# Patient Record
Sex: FEMALE | Race: WHITE | NOT HISPANIC OR LATINO | Employment: OTHER | URBAN - METROPOLITAN AREA
[De-identification: names, ages, dates, MRNs, and addresses within clinical notes are randomized per-mention and may not be internally consistent; named-entity substitution may affect disease eponyms.]

---

## 2017-06-12 ENCOUNTER — APPOINTMENT (OUTPATIENT)
Dept: PREADMISSION TESTING | Facility: HOSPITAL | Age: 57
End: 2017-06-12

## 2017-06-12 ENCOUNTER — HOSPITAL ENCOUNTER (OUTPATIENT)
Dept: GENERAL RADIOLOGY | Facility: HOSPITAL | Age: 57
Discharge: HOME | End: 2017-06-12
Admitting: COLON & RECTAL SURGERY

## 2017-06-12 VITALS — BODY MASS INDEX: 21.89 KG/M2 | HEIGHT: 65 IN | WEIGHT: 131.39 LBS

## 2017-06-12 LAB
ANION GAP SERPL CALCULATED.3IONS-SCNC: 0 MMOL/L (ref 3–11)
BUN BLD-MCNC: 9 MG/DL (ref 9–23)
BUN/CREAT SERPL: 12.9 (ref 7–25)
CALCIUM SPEC-SCNC: 10.7 MG/DL (ref 8.7–10.4)
CHLORIDE SERPL-SCNC: 111 MMOL/L (ref 99–109)
CO2 SERPL-SCNC: 30 MMOL/L (ref 20–31)
CREAT BLD-MCNC: 0.7 MG/DL (ref 0.6–1.3)
DEPRECATED RDW RBC AUTO: 43.1 FL (ref 37–54)
ERYTHROCYTE [DISTWIDTH] IN BLOOD BY AUTOMATED COUNT: 13 % (ref 11.3–14.5)
GFR SERPL CREATININE-BSD FRML MDRD: 87 ML/MIN/1.73
GLUCOSE BLD-MCNC: 96 MG/DL (ref 70–100)
HBA1C MFR BLD: 5.1 % (ref 4.8–5.6)
HCT VFR BLD AUTO: 49.3 % (ref 34.5–44)
HGB BLD-MCNC: 16.5 G/DL (ref 11.5–15.5)
MCH RBC QN AUTO: 30.4 PG (ref 27–31)
MCHC RBC AUTO-ENTMCNC: 33.5 G/DL (ref 32–36)
MCV RBC AUTO: 91 FL (ref 80–99)
PLATELET # BLD AUTO: 375 10*3/MM3 (ref 150–450)
PMV BLD AUTO: 9.3 FL (ref 6–12)
POTASSIUM BLD-SCNC: 4.7 MMOL/L (ref 3.5–5.5)
RBC # BLD AUTO: 5.42 10*6/MM3 (ref 3.89–5.14)
SODIUM BLD-SCNC: 141 MMOL/L (ref 132–146)
WBC NRBC COR # BLD: 11.93 10*3/MM3 (ref 3.5–10.8)

## 2017-06-12 PROCEDURE — 71020 HC CHEST PA AND LATERAL: CPT

## 2017-06-12 PROCEDURE — 93005 ELECTROCARDIOGRAM TRACING: CPT

## 2017-06-12 PROCEDURE — 80048 BASIC METABOLIC PNL TOTAL CA: CPT | Performed by: COLON & RECTAL SURGERY

## 2017-06-12 PROCEDURE — 85027 COMPLETE CBC AUTOMATED: CPT | Performed by: COLON & RECTAL SURGERY

## 2017-06-12 PROCEDURE — 36415 COLL VENOUS BLD VENIPUNCTURE: CPT

## 2017-06-12 PROCEDURE — 83036 HEMOGLOBIN GLYCOSYLATED A1C: CPT | Performed by: COLON & RECTAL SURGERY

## 2017-06-12 PROCEDURE — 93010 ELECTROCARDIOGRAM REPORT: CPT | Performed by: INTERNAL MEDICINE

## 2017-06-12 RX ORDER — OMEPRAZOLE 20 MG/1
20 CAPSULE, DELAYED RELEASE ORAL AS NEEDED
COMMUNITY

## 2017-06-12 RX ORDER — LISINOPRIL 5 MG/1
5 TABLET ORAL DAILY
COMMUNITY

## 2017-06-12 NOTE — NURSING NOTE
FRANTZ for ostomy education in [facility] [employer] start kit reviewed with patient and significant other.  Pt stated it is unlikely she will need an ostomy.  We reviewed what a temporary ileostomy is, diet, reversal of ostomy, diverticulitis, answered all questions.  Will mainor patient in Pre-op as the patient is a week away from surgery.  Please call [phone] for stoma marking or questions or concerns.

## 2017-06-12 NOTE — PAT
GI NURSE NOTIFIED OF SURGERY AT THIS TIME.     STOMA NURSE NOTIFIED PATIENT WILL DO STOMA MARKING DAY OF SURGERY.    ERAS INFORMATION GIVEN AND EXPLAINED.

## 2017-06-19 ENCOUNTER — ANESTHESIA EVENT (OUTPATIENT)
Dept: PERIOP | Facility: HOSPITAL | Age: 57
End: 2017-06-19

## 2017-06-19 ENCOUNTER — HOSPITAL ENCOUNTER (INPATIENT)
Facility: HOSPITAL | Age: 57
LOS: 2 days | Discharge: HOME | End: 2017-06-21
Attending: COLON & RECTAL SURGERY | Admitting: COLON & RECTAL SURGERY

## 2017-06-19 ENCOUNTER — ANESTHESIA (OUTPATIENT)
Dept: PERIOP | Facility: HOSPITAL | Age: 57
End: 2017-06-19

## 2017-06-19 DIAGNOSIS — K57.92 DIVERTICULITIS: ICD-10-CM

## 2017-06-19 PROBLEM — K21.9 GERD (GASTROESOPHAGEAL REFLUX DISEASE): Status: ACTIVE | Noted: 2017-06-19

## 2017-06-19 PROBLEM — I10 HTN (HYPERTENSION): Status: ACTIVE | Noted: 2017-06-19

## 2017-06-19 LAB
ABO GROUP BLD: NORMAL
ABO GROUP BLD: NORMAL
BLD GP AB SCN SERPL QL: NEGATIVE
HCT VFR BLD AUTO: 44.3 % (ref 34.5–44)
HGB BLD-MCNC: 14.7 G/DL (ref 11.5–15.5)
POTASSIUM BLDA-SCNC: 3.87 MMOL/L (ref 3.5–5.3)
RH BLD: POSITIVE
RH BLD: POSITIVE

## 2017-06-19 PROCEDURE — 25010000002 PROPOFOL 10 MG/ML EMULSION: Performed by: NURSE ANESTHETIST, CERTIFIED REGISTERED

## 2017-06-19 PROCEDURE — 85014 HEMATOCRIT: CPT | Performed by: COLON & RECTAL SURGERY

## 2017-06-19 PROCEDURE — 86901 BLOOD TYPING SEROLOGIC RH(D): CPT

## 2017-06-19 PROCEDURE — 0DJD8ZZ INSPECTION OF LOWER INTESTINAL TRACT, VIA NATURAL OR ARTIFICIAL OPENING ENDOSCOPIC: ICD-10-PCS | Performed by: COLON & RECTAL SURGERY

## 2017-06-19 PROCEDURE — 25010000002 HEPARIN (PORCINE) PER 1000 UNITS: Performed by: COLON & RECTAL SURGERY

## 2017-06-19 PROCEDURE — 86900 BLOOD TYPING SEROLOGIC ABO: CPT

## 2017-06-19 PROCEDURE — 84132 ASSAY OF SERUM POTASSIUM: CPT | Performed by: ANESTHESIOLOGY

## 2017-06-19 PROCEDURE — 25010000002 NEOSTIGMINE 10 MG/10ML SOLUTION: Performed by: NURSE ANESTHETIST, CERTIFIED REGISTERED

## 2017-06-19 PROCEDURE — 0DBN4ZZ EXCISION OF SIGMOID COLON, PERCUTANEOUS ENDOSCOPIC APPROACH: ICD-10-PCS | Performed by: COLON & RECTAL SURGERY

## 2017-06-19 PROCEDURE — 25010000002 PROPOFOL 1000 MG/ML EMULSION: Performed by: NURSE ANESTHETIST, CERTIFIED REGISTERED

## 2017-06-19 PROCEDURE — 25010000002 ONDANSETRON PER 1 MG: Performed by: NURSE ANESTHETIST, CERTIFIED REGISTERED

## 2017-06-19 PROCEDURE — 85018 HEMOGLOBIN: CPT | Performed by: COLON & RECTAL SURGERY

## 2017-06-19 PROCEDURE — 86850 RBC ANTIBODY SCREEN: CPT | Performed by: COLON & RECTAL SURGERY

## 2017-06-19 PROCEDURE — 0DTJ4ZZ RESECTION OF APPENDIX, PERCUTANEOUS ENDOSCOPIC APPROACH: ICD-10-PCS | Performed by: COLON & RECTAL SURGERY

## 2017-06-19 PROCEDURE — 86901 BLOOD TYPING SEROLOGIC RH(D): CPT | Performed by: COLON & RECTAL SURGERY

## 2017-06-19 PROCEDURE — 88307 TISSUE EXAM BY PATHOLOGIST: CPT | Performed by: COLON & RECTAL SURGERY

## 2017-06-19 PROCEDURE — 86900 BLOOD TYPING SEROLOGIC ABO: CPT | Performed by: COLON & RECTAL SURGERY

## 2017-06-19 PROCEDURE — 0DBP4ZZ EXCISION OF RECTUM, PERCUTANEOUS ENDOSCOPIC APPROACH: ICD-10-PCS | Performed by: COLON & RECTAL SURGERY

## 2017-06-19 RX ORDER — SODIUM CHLORIDE 0.9 % (FLUSH) 0.9 %
1-10 SYRINGE (ML) INJECTION AS NEEDED
Status: DISCONTINUED | OUTPATIENT
Start: 2017-06-19 | End: 2017-06-19 | Stop reason: HOSPADM

## 2017-06-19 RX ORDER — ALVIMOPAN 12 MG/1
12 CAPSULE ORAL ONCE
Status: COMPLETED | OUTPATIENT
Start: 2017-06-19 | End: 2017-06-19

## 2017-06-19 RX ORDER — NEOSTIGMINE METHYLSULFATE 1 MG/ML
INJECTION, SOLUTION INTRAVENOUS AS NEEDED
Status: DISCONTINUED | OUTPATIENT
Start: 2017-06-19 | End: 2017-06-19 | Stop reason: SURG

## 2017-06-19 RX ORDER — PANTOPRAZOLE SODIUM 40 MG/1
40 TABLET, DELAYED RELEASE ORAL EVERY MORNING
Status: DISCONTINUED | OUTPATIENT
Start: 2017-06-20 | End: 2017-06-19 | Stop reason: HOSPADM

## 2017-06-19 RX ORDER — LISINOPRIL 5 MG/1
5 TABLET ORAL DAILY
Status: DISCONTINUED | OUTPATIENT
Start: 2017-06-20 | End: 2017-06-19 | Stop reason: HOSPADM

## 2017-06-19 RX ORDER — OXYCODONE HYDROCHLORIDE AND ACETAMINOPHEN 5; 325 MG/1; MG/1
1 TABLET ORAL ONCE AS NEEDED
Status: DISCONTINUED | OUTPATIENT
Start: 2017-06-19 | End: 2017-06-19 | Stop reason: HOSPADM

## 2017-06-19 RX ORDER — SODIUM CHLORIDE, SODIUM LACTATE, POTASSIUM CHLORIDE, CALCIUM CHLORIDE 600; 310; 30; 20 MG/100ML; MG/100ML; MG/100ML; MG/100ML
9 INJECTION, SOLUTION INTRAVENOUS CONTINUOUS
Status: DISCONTINUED | OUTPATIENT
Start: 2017-06-19 | End: 2017-06-19 | Stop reason: HOSPADM

## 2017-06-19 RX ORDER — PROMETHAZINE HYDROCHLORIDE 25 MG/1
25 TABLET ORAL ONCE AS NEEDED
Status: DISCONTINUED | OUTPATIENT
Start: 2017-06-19 | End: 2017-06-19 | Stop reason: HOSPADM

## 2017-06-19 RX ORDER — SCOLOPAMINE TRANSDERMAL SYSTEM 1 MG/1
1 PATCH, EXTENDED RELEASE TRANSDERMAL ONCE
Status: DISCONTINUED | OUTPATIENT
Start: 2017-06-19 | End: 2017-06-19

## 2017-06-19 RX ORDER — ACETAMINOPHEN 10 MG/ML
1000 INJECTION, SOLUTION INTRAVENOUS EVERY 6 HOURS
Status: COMPLETED | OUTPATIENT
Start: 2017-06-19 | End: 2017-06-20

## 2017-06-19 RX ORDER — MORPHINE SULFATE 2 MG/ML
1 INJECTION, SOLUTION INTRAMUSCULAR; INTRAVENOUS EVERY 4 HOURS PRN
Status: DISCONTINUED | OUTPATIENT
Start: 2017-06-19 | End: 2017-06-21 | Stop reason: HOSPADM

## 2017-06-19 RX ORDER — HEPARIN SODIUM 5000 [USP'U]/ML
5000 INJECTION, SOLUTION INTRAVENOUS; SUBCUTANEOUS EVERY 8 HOURS SCHEDULED
Status: DISCONTINUED | OUTPATIENT
Start: 2017-06-20 | End: 2017-06-21 | Stop reason: HOSPADM

## 2017-06-19 RX ORDER — GLYCOPYRROLATE 0.2 MG/ML
INJECTION INTRAMUSCULAR; INTRAVENOUS AS NEEDED
Status: DISCONTINUED | OUTPATIENT
Start: 2017-06-19 | End: 2017-06-19 | Stop reason: SURG

## 2017-06-19 RX ORDER — DIAZEPAM 5 MG/ML
5 INJECTION, SOLUTION INTRAMUSCULAR; INTRAVENOUS EVERY 6 HOURS PRN
Status: DISCONTINUED | OUTPATIENT
Start: 2017-06-19 | End: 2017-06-21 | Stop reason: HOSPADM

## 2017-06-19 RX ORDER — ALVIMOPAN 12 MG/1
12 CAPSULE ORAL 2 TIMES DAILY
Status: DISCONTINUED | OUTPATIENT
Start: 2017-06-20 | End: 2017-06-21 | Stop reason: HOSPADM

## 2017-06-19 RX ORDER — ONDANSETRON 2 MG/ML
INJECTION INTRAMUSCULAR; INTRAVENOUS AS NEEDED
Status: DISCONTINUED | OUTPATIENT
Start: 2017-06-19 | End: 2017-06-19 | Stop reason: SURG

## 2017-06-19 RX ORDER — ACETAMINOPHEN 10 MG/ML
1000 INJECTION, SOLUTION INTRAVENOUS ONCE
Status: COMPLETED | OUTPATIENT
Start: 2017-06-19 | End: 2017-06-19

## 2017-06-19 RX ORDER — HYDRALAZINE HYDROCHLORIDE 20 MG/ML
10 INJECTION INTRAMUSCULAR; INTRAVENOUS EVERY 6 HOURS PRN
Status: DISCONTINUED | OUTPATIENT
Start: 2017-06-19 | End: 2017-06-21 | Stop reason: HOSPADM

## 2017-06-19 RX ORDER — DEXTROSE MONOHYDRATE, SODIUM CHLORIDE, SODIUM LACTATE, POTASSIUM CHLORIDE, CALCIUM CHLORIDE 5; 600; 310; 179; 20 G/100ML; MG/100ML; MG/100ML; MG/100ML; MG/100ML
125 INJECTION, SOLUTION INTRAVENOUS CONTINUOUS
Status: DISCONTINUED | OUTPATIENT
Start: 2017-06-19 | End: 2017-06-20

## 2017-06-19 RX ORDER — FENTANYL CITRATE 50 UG/ML
50 INJECTION, SOLUTION INTRAMUSCULAR; INTRAVENOUS
Status: DISCONTINUED | OUTPATIENT
Start: 2017-06-19 | End: 2017-06-19 | Stop reason: HOSPADM

## 2017-06-19 RX ORDER — ONDANSETRON 2 MG/ML
4 INJECTION INTRAMUSCULAR; INTRAVENOUS ONCE AS NEEDED
Status: DISCONTINUED | OUTPATIENT
Start: 2017-06-19 | End: 2017-06-19 | Stop reason: HOSPADM

## 2017-06-19 RX ORDER — LABETALOL HYDROCHLORIDE 5 MG/ML
5 INJECTION, SOLUTION INTRAVENOUS
Status: DISCONTINUED | OUTPATIENT
Start: 2017-06-19 | End: 2017-06-19 | Stop reason: HOSPADM

## 2017-06-19 RX ORDER — PROMETHAZINE HYDROCHLORIDE 25 MG/1
25 SUPPOSITORY RECTAL ONCE AS NEEDED
Status: DISCONTINUED | OUTPATIENT
Start: 2017-06-19 | End: 2017-06-19 | Stop reason: HOSPADM

## 2017-06-19 RX ORDER — MAGNESIUM HYDROXIDE 1200 MG/15ML
LIQUID ORAL AS NEEDED
Status: DISCONTINUED | OUTPATIENT
Start: 2017-06-19 | End: 2017-06-19 | Stop reason: HOSPADM

## 2017-06-19 RX ORDER — PROPOFOL 10 MG/ML
VIAL (ML) INTRAVENOUS AS NEEDED
Status: DISCONTINUED | OUTPATIENT
Start: 2017-06-19 | End: 2017-06-19 | Stop reason: SURG

## 2017-06-19 RX ORDER — HYDRALAZINE HYDROCHLORIDE 20 MG/ML
5 INJECTION INTRAMUSCULAR; INTRAVENOUS
Status: DISCONTINUED | OUTPATIENT
Start: 2017-06-19 | End: 2017-06-19 | Stop reason: HOSPADM

## 2017-06-19 RX ORDER — ONDANSETRON 2 MG/ML
4 INJECTION INTRAMUSCULAR; INTRAVENOUS EVERY 6 HOURS PRN
Status: DISCONTINUED | OUTPATIENT
Start: 2017-06-19 | End: 2017-06-21 | Stop reason: HOSPADM

## 2017-06-19 RX ORDER — HYDROMORPHONE HYDROCHLORIDE 1 MG/ML
0.5 INJECTION, SOLUTION INTRAMUSCULAR; INTRAVENOUS; SUBCUTANEOUS
Status: DISCONTINUED | OUTPATIENT
Start: 2017-06-19 | End: 2017-06-21 | Stop reason: HOSPADM

## 2017-06-19 RX ORDER — FAMOTIDINE 20 MG/1
20 TABLET, FILM COATED ORAL ONCE
Status: COMPLETED | OUTPATIENT
Start: 2017-06-19 | End: 2017-06-19

## 2017-06-19 RX ORDER — LIDOCAINE HYDROCHLORIDE 10 MG/ML
INJECTION, SOLUTION EPIDURAL; INFILTRATION; INTRACAUDAL; PERINEURAL AS NEEDED
Status: DISCONTINUED | OUTPATIENT
Start: 2017-06-19 | End: 2017-06-19 | Stop reason: SURG

## 2017-06-19 RX ORDER — PROMETHAZINE HYDROCHLORIDE 25 MG/ML
6.25 INJECTION, SOLUTION INTRAMUSCULAR; INTRAVENOUS ONCE AS NEEDED
Status: DISCONTINUED | OUTPATIENT
Start: 2017-06-19 | End: 2017-06-19 | Stop reason: HOSPADM

## 2017-06-19 RX ORDER — OXYCODONE AND ACETAMINOPHEN 7.5; 325 MG/1; MG/1
1 TABLET ORAL ONCE AS NEEDED
Status: DISCONTINUED | OUTPATIENT
Start: 2017-06-19 | End: 2017-06-19 | Stop reason: HOSPADM

## 2017-06-19 RX ORDER — MEPERIDINE HYDROCHLORIDE 25 MG/ML
12.5 INJECTION INTRAMUSCULAR; INTRAVENOUS; SUBCUTANEOUS
Status: DISCONTINUED | OUTPATIENT
Start: 2017-06-19 | End: 2017-06-19 | Stop reason: HOSPADM

## 2017-06-19 RX ORDER — HEPARIN SODIUM 5000 [USP'U]/ML
5000 INJECTION, SOLUTION INTRAVENOUS; SUBCUTANEOUS ONCE
Status: COMPLETED | OUTPATIENT
Start: 2017-06-19 | End: 2017-06-19

## 2017-06-19 RX ORDER — PREGABALIN 75 MG/1
75 CAPSULE ORAL ONCE
Status: COMPLETED | OUTPATIENT
Start: 2017-06-19 | End: 2017-06-19

## 2017-06-19 RX ORDER — CELECOXIB 200 MG/1
400 CAPSULE ORAL ONCE
Status: COMPLETED | OUTPATIENT
Start: 2017-06-19 | End: 2017-06-19

## 2017-06-19 RX ORDER — NALOXONE HCL 0.4 MG/ML
0.4 VIAL (ML) INJECTION
Status: DISCONTINUED | OUTPATIENT
Start: 2017-06-19 | End: 2017-06-21 | Stop reason: HOSPADM

## 2017-06-19 RX ORDER — ATRACURIUM BESYLATE 10 MG/ML
INJECTION, SOLUTION INTRAVENOUS AS NEEDED
Status: DISCONTINUED | OUTPATIENT
Start: 2017-06-19 | End: 2017-06-19 | Stop reason: SURG

## 2017-06-19 RX ORDER — NALOXONE HCL 0.4 MG/ML
0.4 VIAL (ML) INJECTION AS NEEDED
Status: DISCONTINUED | OUTPATIENT
Start: 2017-06-19 | End: 2017-06-19 | Stop reason: HOSPADM

## 2017-06-19 RX ORDER — IPRATROPIUM BROMIDE AND ALBUTEROL SULFATE 2.5; .5 MG/3ML; MG/3ML
3 SOLUTION RESPIRATORY (INHALATION) ONCE AS NEEDED
Status: DISCONTINUED | OUTPATIENT
Start: 2017-06-19 | End: 2017-06-19 | Stop reason: HOSPADM

## 2017-06-19 RX ORDER — HYDROMORPHONE HYDROCHLORIDE 1 MG/ML
0.5 INJECTION, SOLUTION INTRAMUSCULAR; INTRAVENOUS; SUBCUTANEOUS
Status: DISCONTINUED | OUTPATIENT
Start: 2017-06-19 | End: 2017-06-19 | Stop reason: HOSPADM

## 2017-06-19 RX ORDER — SODIUM CHLORIDE 9 MG/ML
INJECTION, SOLUTION INTRAVENOUS AS NEEDED
Status: DISCONTINUED | OUTPATIENT
Start: 2017-06-19 | End: 2017-06-19 | Stop reason: HOSPADM

## 2017-06-19 RX ADMIN — HEPARIN SODIUM 5000 UNITS: 5000 INJECTION, SOLUTION INTRAVENOUS; SUBCUTANEOUS at 14:05

## 2017-06-19 RX ADMIN — ATRACURIUM BESYLATE 50 MG: 10 INJECTION, SOLUTION INTRAVENOUS at 15:41

## 2017-06-19 RX ADMIN — GLYCOPYRROLATE 0.2 MG: 0.2 INJECTION, SOLUTION INTRAMUSCULAR; INTRAVENOUS at 17:14

## 2017-06-19 RX ADMIN — PROPOFOL 200 MG: 10 INJECTION, EMULSION INTRAVENOUS at 15:41

## 2017-06-19 RX ADMIN — PROPOFOL 35 MCG/KG/MIN: 10 INJECTION, EMULSION INTRAVENOUS at 15:46

## 2017-06-19 RX ADMIN — FAMOTIDINE 20 MG: 20 TABLET ORAL at 14:10

## 2017-06-19 RX ADMIN — SODIUM CHLORIDE, POTASSIUM CHLORIDE, SODIUM LACTATE AND CALCIUM CHLORIDE: 600; 310; 30; 20 INJECTION, SOLUTION INTRAVENOUS at 17:16

## 2017-06-19 RX ADMIN — CELECOXIB 400 MG: 200 CAPSULE ORAL at 14:33

## 2017-06-19 RX ADMIN — LIDOCAINE HYDROCHLORIDE 50 MG: 10 INJECTION, SOLUTION EPIDURAL; INFILTRATION; INTRACAUDAL; PERINEURAL at 15:41

## 2017-06-19 RX ADMIN — ONDANSETRON 4 MG: 2 INJECTION INTRAMUSCULAR; INTRAVENOUS at 17:04

## 2017-06-19 RX ADMIN — NEOSTIGMINE METHYLSULFATE 1.5 MG: 1 INJECTION, SOLUTION INTRAVENOUS at 17:14

## 2017-06-19 RX ADMIN — ACETAMINOPHEN 1000 MG: 10 INJECTION, SOLUTION INTRAVENOUS at 18:10

## 2017-06-19 RX ADMIN — SODIUM CHLORIDE, POTASSIUM CHLORIDE, SODIUM LACTATE AND CALCIUM CHLORIDE 9 ML/HR: 600; 310; 30; 20 INJECTION, SOLUTION INTRAVENOUS at 13:55

## 2017-06-19 RX ADMIN — PREGABALIN 75 MG: 75 CAPSULE ORAL at 14:10

## 2017-06-19 RX ADMIN — ERTAPENEM SODIUM 1 G: 1 INJECTION, POWDER, LYOPHILIZED, FOR SOLUTION INTRAMUSCULAR; INTRAVENOUS at 14:15

## 2017-06-19 RX ADMIN — POTASSIUM CHLORIDE, SODIUM CHLORIDE, CALCIUM CHLORIDE, SODIUM LACTATE, AND DEXTROSE MONOHYDRATE 125 ML/HR: 1.79; 6; .2; 3.1; 5 INJECTION, SOLUTION INTRAVENOUS at 20:24

## 2017-06-19 RX ADMIN — ACETAMINOPHEN 1000 MG: 10 INJECTION, SOLUTION INTRAVENOUS at 16:35

## 2017-06-19 RX ADMIN — SCOPALAMINE 1 PATCH: 1 PATCH, EXTENDED RELEASE TRANSDERMAL at 14:10

## 2017-06-19 RX ADMIN — ALVIMOPAN 12 MG: 12 CAPSULE ORAL at 14:33

## 2017-06-19 NOTE — PLAN OF CARE
Problem: Patient Care Overview (Adult)  Goal: Plan of Care Review  Outcome: Ongoing (interventions implemented as appropriate)    06/19/17 7508   Coping/Psychosocial Response Interventions   Plan Of Care Reviewed With patient;family   Patient Care Overview   Progress no change   Outcome Evaluation   Outcome Summary/Follow up Plan Pt marked for colo versus ileo (most likely will be an ileo) in right and left upper and lower quadrants. Pt able to visualize sites. Beltline marked on abdomen. Education reviewed and questions answered. WOCN will f/u PRN after sx if needed for ostomy care and management.

## 2017-06-19 NOTE — BRIEF OP NOTE
COLON RESECTION LAPAROSCOPIC SIGMOID OR LOW ANTERIOR  Procedure Note    Kendy Langley  6/19/2017    Pre-op Diagnosis:   Recurrent diverticulitis, unwanted appendix    Post-op Diagnosis:     Same  Procedure/CPT® Codes:      Procedure(s):  COLON RESECTION LAPAROSCOPIC LOW ANTERIOR, WITH APPENDECTOMY    Surgeon(s):  Petra Coburn MD    Anesthesia: General with Block    Staff:   Circulator: Claudia Mckeon RN; Leeann Granados RN  Scrub Person: Majo Zimmer  Assistant: TATI Perrin    Estimated Blood Loss: 25 mL  Urine Voided: 50 mL    Specimens:                  ID Type Source Tests Collected by Time Destination   A : APPENDIX  Tissue Large Intestine, Appendix TISSUE EXAM Petra Coburn MD 6/19/2017 1640    B : sigmoid, upper rectum, anastomosis tissue Tissue Large Intestine, Sigmoid Colon TISSUE EXAM Petra Coburn MD 6/19/2017 1707          Drains:   Urethral Catheter 06/19/17 100% silicone 16 (Active)   Daily Indications Selected surgeries ( tract, abdomen) 6/19/2017  5:30 PM   Securement secured to upper leg with adhesive device 6/19/2017  5:30 PM           Findings: Chronically inflamed sigmoid colon, negative leak test    Complications: None      Petra Coburn MD     Date: 6/19/2017  Time: 5:48 PM

## 2017-06-19 NOTE — H&P
"Admission HP     Patient Name: Kendy Langley  MRN: [id number]  : 1960  DOS: 2017    Attending: Petra Coburn MD    Primary Care Provider: LONNY Joshi      Patient Care Team:  LONNY Silvestre as PCP - General (Family Medicine)    Chief complaint:  Diverticulitis    Subjective   Patient is a 56 y.o. female presented for schedule surgery by Dr. Coburn.  She anticipates a low anterior colon resection.  She has had diverticulosis for about 6 years. She had colonoscopy in May by Dr. Munoz and was found to have \"some infection\". At that time she was having fevers, abdominal pain, nausea and weakness. She was treated with a week of oral antibiotics and was referred to Dr. Coburn.    When seen preop she is doing well.  She denies complaints.  She denies nausea, shortness of breath or chest pain. No hx of DVT or PE.    ( She underwent hand-assisted laparoscopic low anterior resection and laparoscopic appendectomy. Tolerated procedure well, and was admitted for further management.   Seen in her room postop, she is doing well, good pain control. No n/vom/sob.)wy    Allergies:  Allergies   Allergen Reactions   • Novocain [Procaine] Other (See Comments)     HAD HORRIBLE SITUATION WITH DENTIST WHERE GIVEN TOO MUCH-  \"EVERYTHING STOPPED AND I JUST STARRED AT WALL\" COULDN'T CONTROL SELF - BLOOD PRESSURE DROPPED AND LOST CONTROL VOMITING AND ABOUT TO LOSE CONTROL OF BOWELS        Meds:  Prescriptions Prior to Admission   Medication Sig Dispense Refill Last Dose   • lisinopril (PRINIVIL,ZESTRIL) 5 MG tablet Take 5 mg by mouth Daily.   2017 at 0900   • aspirin 81 MG tablet Take 81 mg by mouth Daily. HASNT TAKEN IN ABOUT 2 WEEKS - NO STENTS      • omeprazole (priLOSEC) 20 MG capsule Take 20 mg by mouth As Needed (GERD).   More than a month at Unknown time       History:   Past Medical History:   Diagnosis Date   • Back pain    • Heartburn    • Hiatal hernia    • History of kidney stones    • " Hypertension    • Wears glasses     READERS   • Wears partial dentures     LOWER     Past Surgical History:   Procedure Laterality Date   • COLONOSCOPY     • ENDOSCOPY     • KIDNEY STONE SURGERY     • WISDOM TOOTH EXTRACTION       History reviewed. No pertinent family history.  Social History   Substance Use Topics   • Smoking status: Former Smoker     Packs/day: 1.00     Years: 14.00     Types: Cigarettes     Quit date: 2013   • Smokeless tobacco: Never Used      Comment: AROUND SECONDHAND SMOKE    • Alcohol use No   . 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    Review of Systems  All systems were reviewed and negative except for:  Gastrointestinal: postitive for  constipation    Vital Signs  /71 (BP Location: Right arm, Patient Position: Lying)  Pulse 74  Temp 97.2 °F (36.2 °C) (Temporal Artery )   Resp 16  SpO2 100%    Physical Exam:    General Appearance:    Alert, cooperative, in no acute distress   Head:    Normocephalic, without obvious abnormality, atraumatic   Eyes:            Lids and lashes normal, conjunctivae and sclerae normal, no   icterus, no pallor, corneas clear, PERRLA   Ears:    Ears appear intact with no abnormalities noted   Neck:   No adenopathy, supple, trachea midline, no thyromegaly, no     carotid bruit, no JVD   Lungs:     Clear to auscultation,respirations regular, even and                   unlabored    Heart:    Regular rhythm and normal rate, normal S1 and S2, no            murmur, no gallop, no rub, no click   Abdomen:     Normal bowel sounds, no masses, no organomegaly, soft        non-tender, non-distended, no guarding, no rebound                 Tenderness    (Postop exam: soft , benign abdomen, with clean incisions. Minimal expected tenderness. wy)   Genitalia:    Deferred   Extremities:   Moves all extremities well, no edema, no cyanosis, no              redness   Pulses:   Pulses palpable and equal bilaterally   Skin:   No bleeding, bruising or rash   Neurologic:   Cranial  nerves 2 - 12 grossly intact, sensation intact     Results for EARNESTINE TIERNEY (MRN [id number]) as of 6/19/2017 13:31   Ref. Range 6/12/2017 10:33   Glucose Latest Ref Range: 70 - 100 mg/dL 96   Sodium Latest Ref Range: 132 - 146 mmol/L 141   Potassium Latest Ref Range: 3.5 - 5.5 mmol/L 4.7   CO2 Latest Ref Range: 20.0 - 31.0 mmol/L 30.0   Chloride Latest Ref Range: 99 - 109 mmol/L 111 (H)   Anion Gap Latest Ref Range: 3.0 - 11.0 mmol/L 0.0 (L)   Creatinine Latest Ref Range: 0.60 - 1.30 mg/dL 0.70   BUN Latest Ref Range: 9 - 23 mg/dL 9   BUN/Creatinine Ratio Latest Ref Range: 7.0 - 25.0  12.9   Calcium Latest Ref Range: 8.7 - 10.4 mg/dL 10.7 (H)   eGFR Non African Amer Latest Ref Range: >60 mL/min/1.73 87   Hemoglobin A1C Latest Ref Range: 4.80 - 5.60 % 5.10   WBC Latest Ref Range: 3.50 - 10.80 10*3/mm3 11.93 (H)   RBC Latest Ref Range: 3.89 - 5.14 10*6/mm3 5.42 (H)   Hemoglobin Latest Ref Range: 11.5 - 15.5 g/dL 16.5 (H)   Hematocrit Latest Ref Range: 34.5 - 44.0 % 49.3 (H)   RDW Latest Ref Range: 11.3 - 14.5 % 13.0   MCV Latest Ref Range: 80.0 - 99.0 fL 91.0   MCH Latest Ref Range: 27.0 - 31.0 pg 30.4   MCHC Latest Ref Range: 32.0 - 36.0 g/dL 33.5   MPV Latest Ref Range: 6.0 - 12.0 fL 9.3   Platelets Latest Ref Range: 150 - 450 10*3/mm3 375       Assessment and Plan:   Active Problems:    Diverticulitis    GERD (gastroesophageal reflux disease)    HTN (hypertension)      Plan  1. Ambulation. Encouraged.   2. Pain control-prns   3. IS-encourage  4. DVT proph- mechs/heparin  5. Bowel regimen- entereg  6. Resume home medications as appropriate  7. Monitor post-op labs  8. DC planning when evidence of return of bowel function  9. Diet, ADAT /IVF initially.     Seen and examined by me. Agree with above. Discussed with patient.     Nelson Bishop MD  06/19/17  7:59 PM

## 2017-06-19 NOTE — H&P
[facility] Pre-op    Full history and physical note from office is up to date.  See office note from 5/31/17.  ROS:  No fever, chills, rashes.  No CP, palps.  No cough, wheeze, SOA  BP (!) 135/107 (BP Location: Right arm, Patient Position: Lying)  Pulse 72  Temp 97.9 °F (36.6 °C) (Temporal Artery )   Resp 18  SpO2 97%    CV:  S1S2 reg no murmur  Resp:  CTAB    IMM:  Influenza:  Yes 2016  Pneumococcal:  no  Tetanus:  unknown    Cancer Staging (if applicable)  Cancer Patient: __ yes x__no __unknown__N/A; If yes, clinical stage T:__ N:__M:__, stage group or __N/A    Magui Frias, APRN 6/19/2017 2:13 PM

## 2017-06-19 NOTE — OP NOTE
DATE OF PROCEDURE:  06/19/2017    PREOPERATIVE DIAGNOSES:  1. Recurrent diverticulitis.   2. Unwanted appendectomy.     POSTOPERATIVE DIAGNOSES:  1. Recurrent diverticulitis.   2. Unwanted appendectomy.     PROCEDURES PERFORMED:  1. Hand-assisted laparoscopic low anterior resection.   2. Laparoscopic appendectomy.   3. Rigid proctoscopy.     SURGEON: Petra Coburn MD    ASSISTANT: KARYN Salguero    ESTIMATED BLOOD LOSS: 50 mL     COMPLICATIONS: None.     SPECIMENS:  1. Sigmoid colon and upper rectum.   2. Anastomotic tissue.  3. Appendix.    INDICATIONS: The patient is a 56-year-old female found to have recurrent sigmoid diverticulitis. Risks, benefits, and alternatives of surgical resection were discussed with the patient, she decided to proceed with operative management. She also requested prophylactic appendectomy.     DESCRIPTION OF PROCEDURE: The patient was brought to the operating room and placed in the supine position. After successful induction of general endotracheal anesthesia, she was placed in the low lithotomy position in [facility]. The operative site was prepped and draped in the usual sterile fashion. A 7 cm midline incision was made. There were no complications upon entering the abdomen. A 5 mm trocar was placed in the suprapubic location and 12 mm trocar in the right lower quadrant. The abdomen was inspected and there was clearly evidence of thickened mesentery of the sigmoid colon. The ascending colon was soft, although there were a few scattered diverticulum around. The left colon was then mobilized at the line of Toldt. The left ureter was identified and kept out of the plane of dissection at all times. The right side of the mesorectum was then incised and the dissection was taken down well below the sacral promontory. There was some adhesions to the rectovaginal septum. These were taken down sharply to allow a large sizer to come up easily. The mesorectum was then transected using the  LigaSure. The upper rectum was transected using 2 loads of green [place] 60 MATILDE stapler. The IVETT pedicle was then ligated using the LigaSure. Attention was then turned to the appendix. It was transected using a blue load of the Hatillo 60 stapler and the mesoappendix was taken using the LigaSure. The bowel was then extracorporealized. A healthy spot on the descending colon was chosen for the point of transection. This was done using a pursestring clamp and a Kocher. A #31 anvil was then placed in the proximal bowel and secured using the pursestring stitch. Bowel was then placed back into the abdomen. The tenia was placed in the straight position and the mesentery was not twisted. A circular stapler was then placed through the anus and up through just anterior to the transverse staple line. The spike was opened. The anvil and the spike were . The stapler was closed and the stapler was fired, then opened and removed from the rectum. There were 2 good anastomotic rings. The anastomosis was then placed under saline irrigation and proximally occluded. Rigid proctoscopy was then performed and the anastomosis was found to be hemostatic, airtight, and circumferentially intact. The rectum was desufflated and pelvis was irrigated. Again, the abdomen was inspected for hemostasis, which there was. There was no tension on the anastomosis. You could easily fit a hand underneath it in the left lower quadrant. The 12 mm port site was closed with NeoClose device under direct visualization. The omentum was then placed under the fascia. The fascia was closed with running #1 non- looped PDS x2. The wound was irrigated and dried. The skin was closed with Monocryl and Dermabond. Prineo dressing was applied.     All counts were announced as correct at the end the case.     The patient tolerated the procedure well, was extubated in the operating room and transferred to recovery in stable condition.       Petra Coburn,  MD JIMENEZ/padmini  DD: 06/19/2017 17:56:45  DT: 06/19/2017 18:40:38  Voice Rec. ID #[id number]  Voice Original ID #[id number]  Doc ID #[id number]  Rev. #0  cc:    Hossein Wellington, OB Resident*  Martin Munoz M.D.

## 2017-06-20 LAB
ANION GAP SERPL CALCULATED.3IONS-SCNC: 3 MMOL/L (ref 3–11)
BASOPHILS # BLD AUTO: 0.02 10*3/MM3 (ref 0–0.2)
BASOPHILS NFR BLD AUTO: 0.1 % (ref 0–1)
BUN BLD-MCNC: 8 MG/DL (ref 9–23)
BUN/CREAT SERPL: 11.4 (ref 7–25)
CALCIUM SPEC-SCNC: 9.4 MG/DL (ref 8.7–10.4)
CHLORIDE SERPL-SCNC: 108 MMOL/L (ref 99–109)
CO2 SERPL-SCNC: 24 MMOL/L (ref 20–31)
CREAT BLD-MCNC: 0.7 MG/DL (ref 0.6–1.3)
DEPRECATED RDW RBC AUTO: 43 FL (ref 37–54)
EOSINOPHIL # BLD AUTO: 0 10*3/MM3 (ref 0.1–0.3)
EOSINOPHIL NFR BLD AUTO: 0 % (ref 0–3)
ERYTHROCYTE [DISTWIDTH] IN BLOOD BY AUTOMATED COUNT: 12.8 % (ref 11.3–14.5)
GFR SERPL CREATININE-BSD FRML MDRD: 87 ML/MIN/1.73
GLUCOSE BLD-MCNC: 166 MG/DL (ref 70–100)
HCT VFR BLD AUTO: 42.2 % (ref 34.5–44)
HGB BLD-MCNC: 13.9 G/DL (ref 11.5–15.5)
IMM GRANULOCYTES # BLD: 0.06 10*3/MM3 (ref 0–0.03)
IMM GRANULOCYTES NFR BLD: 0.3 % (ref 0–0.6)
LYMPHOCYTES # BLD AUTO: 0.85 10*3/MM3 (ref 0.6–4.8)
LYMPHOCYTES NFR BLD AUTO: 5 % (ref 24–44)
MAGNESIUM SERPL-MCNC: 1.9 MG/DL (ref 1.3–2.7)
MCH RBC QN AUTO: 30.5 PG (ref 27–31)
MCHC RBC AUTO-ENTMCNC: 32.9 G/DL (ref 32–36)
MCV RBC AUTO: 92.7 FL (ref 80–99)
MONOCYTES # BLD AUTO: 0.44 10*3/MM3 (ref 0–1)
MONOCYTES NFR BLD AUTO: 2.6 % (ref 0–12)
NEUTROPHILS # BLD AUTO: 15.79 10*3/MM3 (ref 1.5–8.3)
NEUTROPHILS NFR BLD AUTO: 92 % (ref 41–71)
PLATELET # BLD AUTO: 369 10*3/MM3 (ref 150–450)
PMV BLD AUTO: 9.4 FL (ref 6–12)
POTASSIUM BLD-SCNC: 4.5 MMOL/L (ref 3.5–5.5)
RBC # BLD AUTO: 4.55 10*6/MM3 (ref 3.89–5.14)
SODIUM BLD-SCNC: 135 MMOL/L (ref 132–146)
WBC NRBC COR # BLD: 17.16 10*3/MM3 (ref 3.5–10.8)

## 2017-06-20 PROCEDURE — 83735 ASSAY OF MAGNESIUM: CPT | Performed by: COLON & RECTAL SURGERY

## 2017-06-20 PROCEDURE — 85025 COMPLETE CBC W/AUTO DIFF WBC: CPT | Performed by: COLON & RECTAL SURGERY

## 2017-06-20 PROCEDURE — 25010000002 HEPARIN (PORCINE) PER 1000 UNITS: Performed by: COLON & RECTAL SURGERY

## 2017-06-20 PROCEDURE — 80048 BASIC METABOLIC PNL TOTAL CA: CPT | Performed by: NURSE PRACTITIONER

## 2017-06-20 RX ORDER — IBUPROFEN 600 MG/1
600 TABLET ORAL
Status: DISCONTINUED | OUTPATIENT
Start: 2017-06-20 | End: 2017-06-21 | Stop reason: HOSPADM

## 2017-06-20 RX ORDER — TRAMADOL HYDROCHLORIDE 50 MG/1
100 TABLET ORAL EVERY 6 HOURS PRN
Status: DISCONTINUED | OUTPATIENT
Start: 2017-06-20 | End: 2017-06-21 | Stop reason: HOSPADM

## 2017-06-20 RX ORDER — ACETAMINOPHEN 325 MG/1
650 TABLET ORAL EVERY 6 HOURS
Status: DISCONTINUED | OUTPATIENT
Start: 2017-06-20 | End: 2017-06-21 | Stop reason: HOSPADM

## 2017-06-20 RX ORDER — TRAMADOL HYDROCHLORIDE 50 MG/1
50 TABLET ORAL EVERY 6 HOURS PRN
Status: DISCONTINUED | OUTPATIENT
Start: 2017-06-20 | End: 2017-06-21 | Stop reason: HOSPADM

## 2017-06-20 RX ADMIN — ALVIMOPAN 12 MG: 12 CAPSULE ORAL at 17:45

## 2017-06-20 RX ADMIN — IBUPROFEN 600 MG: 600 TABLET, FILM COATED ORAL at 17:45

## 2017-06-20 RX ADMIN — ALVIMOPAN 12 MG: 12 CAPSULE ORAL at 08:38

## 2017-06-20 RX ADMIN — IBUPROFEN 600 MG: 600 TABLET, FILM COATED ORAL at 20:54

## 2017-06-20 RX ADMIN — HEPARIN SODIUM 5000 UNITS: 5000 INJECTION, SOLUTION INTRAVENOUS; SUBCUTANEOUS at 15:24

## 2017-06-20 RX ADMIN — IBUPROFEN 600 MG: 600 TABLET, FILM COATED ORAL at 11:14

## 2017-06-20 RX ADMIN — HEPARIN SODIUM 5000 UNITS: 5000 INJECTION, SOLUTION INTRAVENOUS; SUBCUTANEOUS at 20:56

## 2017-06-20 RX ADMIN — ACETAMINOPHEN 1000 MG: 10 INJECTION, SOLUTION INTRAVENOUS at 01:08

## 2017-06-20 RX ADMIN — ACETAMINOPHEN 1000 MG: 10 INJECTION, SOLUTION INTRAVENOUS at 05:48

## 2017-06-20 RX ADMIN — POTASSIUM CHLORIDE, SODIUM CHLORIDE, CALCIUM CHLORIDE, SODIUM LACTATE, AND DEXTROSE MONOHYDRATE 125 ML/HR: 1.79; 6; .2; 3.1; 5 INJECTION, SOLUTION INTRAVENOUS at 04:07

## 2017-06-20 RX ADMIN — HEPARIN SODIUM 5000 UNITS: 5000 INJECTION, SOLUTION INTRAVENOUS; SUBCUTANEOUS at 05:48

## 2017-06-20 NOTE — PROGRESS NOTES
Doing well, passing gas, kin clears  No red flags  Chart data reviewed  VSS  Exam appropriate    ADAT  PO pain meds  D/c babb  Home in am  Ultram rx on chart  F/u with me 2 weeks

## 2017-06-20 NOTE — PROGRESS NOTES
progress note      Kendy Langley  [id number]  1960     LOS: 1 day     Attending: Petra Coburn MD    Primary Care Provider: LONNY Joshi      Chief Complaint/Reason for visit:  No chief complaint on file.      Subjective   Feels ok, good pain control. Tolerated clears. No f/c/n/vom.     Objective     Vital Signs  Blood pressure 103/48, pulse 71, temperature 98.3 °F (36.8 °C), temperature source Oral, resp. rate 18, SpO2 96 %.  Temp (24hrs), Av.7 °F (36.5 °C), Min:97.2 °F (36.2 °C), Max:98.3 °F (36.8 °C)      Intake/Output:    Intake/Output Summary (Last 24 hours) at 17 1301  Last data filed at 17 1100   Gross per 24 hour   Intake          3282.16 ml   Output             1665 ml   Net          1617.16 ml          Physical Exam:     General Appearance:    Alert, cooperative, in no acute distress   Head:    Normocephalic, without obvious abnormality, atraumatic    Lungs:     Normal effort, symmetric chest rise, no crepitus, clear to      auscultation bilaterally                  Heart:    Regular rhythm and normal rate, normal S1 and S2    Abdomen:     Soft, benign. Mild distention. Clean incisions.    Extremities:   No clubbing, cyanosis or edema.  No deformities.    Pulses:   Pulses palpable and equal bilaterally   Skin:   No bleeding, bruising or rash          Results Review:     I reviewed the patient's new clinical results.     Results from last 7 days  Lab Units 17  0339 17  1801   WBC 10*3/mm3 17.16*  --    HEMOGLOBIN g/dL 13.9 14.7   HEMATOCRIT % 42.2 44.3*   PLATELETS 10*3/mm3 369  --        Results from last 7 days  Lab Units 17  0339   SODIUM mmol/L 135   POTASSIUM mmol/L 4.5   CHLORIDE mmol/L 108   TOTAL CO2 mmol/L 24.0   BUN mg/dL 8*   CREATININE mg/dL 0.70   CALCIUM mg/dL 9.4   GLUCOSE mg/dL 166*     I reviewed the patient's new imaging including images and reports.    All medications reviewed.     acetaminophen 650 mg Oral Q6H   alvimopan 12 mg  Oral BID   heparin (porcine) 5,000 Units Subcutaneous Q8H   ibuprofen 600 mg Oral 4x Daily AC & at Bedtime   Pharmacy Meds to Bed Consult  Does not apply Daily         Assessment/Plan     Active Problems:    Diverticulitis, s/p hand-assisted laparoscopic low anterior resection and laparoscopic appendectomy    GERD (gastroesophageal reflux disease)    HTN (hypertension)    Leukocytosis, likely reactive.     Plan  1. Ambulation  2. Pain control-prns   3. IS-encouraged  4. DVT proph-Hep SQ, mechanicals.   5. Bowel regimen  6. Diet, ADAT.   7. Monitor post-op labs  8. DC planning. Likely home tomorrow.     Nelson Bishop MD  06/20/17  1:01 PM

## 2017-06-20 NOTE — PLAN OF CARE
Problem: Patient Care Overview (Adult)  Goal: Plan of Care Review  Outcome: Ongoing (interventions implemented as appropriate)    06/20/17 6481   Coping/Psychosocial Response Interventions   Plan Of Care Reviewed With patient   Patient Care Overview   Progress progress toward functional goals as expected       Goal: Adult Individualization and Mutuality  Outcome: Ongoing (interventions implemented as appropriate)

## 2017-06-20 NOTE — PROGRESS NOTES
Discharge Planning Assessment  [facility]     Patient Name: Kendy Langley  MRN: [id number]  Today's Date: 6/20/2017    Admit Date: 6/19/2017          Discharge Needs Assessment       06/20/17 1425    Living Environment    Lives With spouse    Living Arrangements mobile home    Home Accessibility no concerns;stairs to enter home    Number of Stairs to Enter Home 2    Stair Railings at Home none    Type of Financial/Environmental Concern none    Transportation Available car    Living Environment Comment CM spoke with pt in room with permission in regards to discharge planning. Pt resides in [place] in a double wide with spouse, Jamir. Pt is independent of ADLs prior to admission.     Living Environment    Provides Primary Care For no one    Quality Of Family Relationships supportive    Able to Return to Prior Living Arrangements yes    Living Arrangement Comments Pt goal is to return home with spouse.    Discharge Needs Assessment    Concerns To Be Addressed denies needs/concerns at this time    Readmission Within The Last 30 Days no previous admission in last 30 days    Anticipated Changes Related to Illness other (see comments)   Pt denies discharge needs    Equipment Currently Used at Home none    Equipment Needed After Discharge none    Discharge Disposition still a patient    Discharge Contact Information if Applicable Jamir Langley(spouse): [phone] or [phone]    Discharge Planning Comments Pt  has [employer] Insurance and denies recent changes in insurance. Pt states her insurance does provide prescription coverage and copays are affordable. Pt uses [email or web address] in [facility]. Pt denies discharge needs and reports family will help her when medically ready for discharge. CM will cont to follow            Discharge Plan       06/20/17 1433    Case Management/Social Work Plan    Plan discharge plan    Patient/Family In Agreement With Plan yes    Additional Comments Pt goal is home with family. Pt denies  discharge needs. CM will cont to follow,        Discharge Placement     No information found                Demographic Summary       06/20/17 142    Primary Care Physician Information    Name Luis Huffman            Functional Status       06/20/17 142    Functional Status Prior    Ambulation 0-->independent    Transferring 0-->independent    Toileting 0-->independent    Bathing 0-->independent    Dressing 0-->independent    Eating 0-->independent    Communication 0-->understands/communicates without difficulty    Swallowing 0-->swallows foods/liquids without difficulty            Psychosocial     None            Abuse/Neglect     None            Legal     None            Substance Abuse     None            Patient Forms     None          Abby Dumas, RN

## 2017-06-21 VITALS
HEART RATE: 61 BPM | TEMPERATURE: 97.8 F | OXYGEN SATURATION: 96 % | HEIGHT: 65 IN | RESPIRATION RATE: 18 BRPM | WEIGHT: 131.39 LBS | DIASTOLIC BLOOD PRESSURE: 77 MMHG | SYSTOLIC BLOOD PRESSURE: 137 MMHG | BODY MASS INDEX: 21.89 KG/M2

## 2017-06-21 LAB
CYTO UR: NORMAL
LAB AP CASE REPORT: NORMAL
LAB AP CLINICAL INFORMATION: NORMAL
Lab: NORMAL
PATH REPORT.FINAL DX SPEC: NORMAL
PATH REPORT.GROSS SPEC: NORMAL

## 2017-06-21 PROCEDURE — 94799 UNLISTED PULMONARY SVC/PX: CPT

## 2017-06-21 PROCEDURE — 25010000002 HEPARIN (PORCINE) PER 1000 UNITS: Performed by: COLON & RECTAL SURGERY

## 2017-06-21 RX ADMIN — HEPARIN SODIUM 5000 UNITS: 5000 INJECTION, SOLUTION INTRAVENOUS; SUBCUTANEOUS at 05:53

## 2017-06-21 RX ADMIN — ACETAMINOPHEN 650 MG: 325 TABLET, FILM COATED ORAL at 05:53

## 2017-06-21 RX ADMIN — ACETAMINOPHEN 650 MG: 325 TABLET, FILM COATED ORAL at 10:53

## 2017-06-21 RX ADMIN — IBUPROFEN 600 MG: 600 TABLET, FILM COATED ORAL at 08:35

## 2017-06-21 RX ADMIN — IBUPROFEN 600 MG: 600 TABLET, FILM COATED ORAL at 11:42

## 2017-06-21 NOTE — PROGRESS NOTES
"Adult Nutrition  Assessment/PES    Patient Name:  Kendy Langley  YOB: 1960  MRN: [id number]  Admit Date:  6/19/2017    Assessment Date:  6/21/2017        Reason for Assessment       06/21/17 1303    Reason for Assessment    Reason For Assessment/Visit diagnosis/disease state    Time Spent (min) 20    Cardiac HTN    Gastrointestinal Diverticulitis    Other diagnosis s/p Laparoscopic LAR with Appendectomy (06/19/17)              Nutrition/Diet History       06/21/17 1304    Nutrition/Diet History    Reported/Observed By Patient    Appetite Good    Other Pt waiting to be d'c at time of visit.  Pt reports appetite to be good but stated that \"the food isn't very good\".  Pt reports eating 50% of breakfast and stated that she ate really well yesterday.  Pt reports tolerating food well with no n/v.            Anthropometrics       06/21/17 1306    Anthropometrics    Height 165.1 cm (65\")    Weight 59.6 kg (131 lb 6.3 oz)    Ideal Body Weight (IBW)    Ideal Body Weight (IBW), Female 57.69    % Ideal Body Weight 103.53    Body Mass Index (BMI)    BMI (kg/m2) 21.91            Labs/Tests/Procedures/Meds       06/21/17 1306    Labs/Tests/Procedures/Meds    Labs/Tests Review Reviewed                Nutrition Prescription Ordered       06/21/17 1306    Nutrition Prescription PO    Current PO Diet Regular    Common Modifiers GI Soft/Antelope            Evaluation of Received Nutrient/Fluid Intake       06/21/17 1306    PO Evaluation    Number of Meals 4    % PO Intake 56              Problem/Interventions:        Problem 1       06/21/17 1306    Nutrition Diagnoses Problem 1    Problem 1 Nutrition Appropriate for Condition at this Time    Etiology (related to) Medical Diagnosis   clinical condition    Signs/Symptoms (evidenced by) PO Intake    Percent (%) intake recorded 56 %    Over number of meals 4                    Intervention Goal       06/21/17 1307    Intervention Goal    General Nutrition support " treatment    PO Maintain intake            Nutrition Intervention       06/21/17 1307    Nutrition Intervention    RD/Tech Action Care plan reviewd;Follow Tx progress;Encourage intake              Education/Evaluation       06/21/17 1308    Monitor/Evaluation    Monitor Per protocol;PO intake        Comments:      Electronically signed by:  Helen Byrnes CNA  06/21/17 1:08 PM

## 2017-06-21 NOTE — DISCHARGE SUMMARY
"Patient Name: Kendy Langley  MRN: [id number]  : 1960  DOS: 2017    Attending: Petra Coburn MD    Primary Care Provider: LONNY Joshi    Date of Admission:.2017 12:48 PM    Date of Discharge:  2017    Discharge Diagnosis: Active Problems:    Diverticulitis, s/p hand-assisted laparoscopic low anterior resection and laparoscopic appendectomy    GERD (gastroesophageal reflux disease)    HTN (hypertension)      Hospital Course  Patient is a 56 y.o. female presented for low anterior resection and appendectomy by Dr. Coburn under [place]. She tolerated surgery well and was admitted for further management.  She had diverticulosis for about 6 years. She had colonoscopy in May by Dr. Munoz and was found to have \"some infection\". At that time she was having fevers, abdominal pain, nausea and weakness. She was treated with a week of oral antibiotics and was referred to Dr. Coburn.    She was provided pain medication as needed for pain control.  She received DVT prophylaxis with subcutaneous Heparin as well as mechanicals    She used an IS for atelectasis prophylaxis.    During her stay, she passed flatus and bowel movements and tolerated her by mouth diet well.  She was provided a bowel regimen and was encouraged to ambulate frequently which she did.    Home medications were resumed as appropriate, and labs were monitored and remained fairly stable.     With the progress she has made, she is ready for DC home today.    Discussed with patient regarding plan and she shows understanding and agreement.        Procedures Performed  DATE OF PROCEDURE:  2017     PREOPERATIVE DIAGNOSES:  1. Recurrent diverticulitis.   2. Unwanted appendectomy.      POSTOPERATIVE DIAGNOSES:  1. Recurrent diverticulitis.   2. Unwanted appendectomy.      PROCEDURES PERFORMED:  1. Hand-assisted laparoscopic low anterior resection.   2. Laparoscopic appendectomy.   3. Rigid proctoscopy.      SURGEON: Petra Coburn MD   " "    Pertinent Test Results:    I reviewed the patient's new clinical results.     Results from last 7 days  Lab Units 17  0339 17  1801   WBC 10*3/mm3 17.16*  --    HEMOGLOBIN g/dL 13.9 14.7   HEMATOCRIT % 42.2 44.3*   PLATELETS 10*3/mm3 369  --        Results from last 7 days  Lab Units 17  0339   SODIUM mmol/L 135   POTASSIUM mmol/L 4.5   CHLORIDE mmol/L 108   TOTAL CO2 mmol/L 24.0   BUN mg/dL 8*   CREATININE mg/dL 0.70   CALCIUM mg/dL 9.4   GLUCOSE mg/dL 166*     I reviewed the patient's new imaging including images and reports.    Discharge Assessment:    Vital Signs  /77 (BP Location: Left arm, Patient Position: Sitting)  Pulse 61  Temp 97.8 °F (36.6 °C) (Oral)   Resp 18  Ht 65\" (165.1 cm)  Wt 131 lb 6.3 oz (59.6 kg)  SpO2 96%  BMI 21.87 kg/m2  Temp (24hrs), Av °F (36.7 °C), Min:97.8 °F (36.6 °C), Max:98.2 °F (36.8 °C)      General Appearance:    Alert, cooperative, in no acute distress   Lungs:     Clear to auscultation,respirations regular, even and                   unlabored    Heart:    Regular rhythm and normal rate, normal S1 and S2, no            murmur, no gallop, no rub, no click   Abdomen:     Soft, benign. Mild distention. Clean incisions.   Extremities:   Moves all extremities well, no edema, no cyanosis, no              redness   Pulses:   Pulses palpable and equal bilaterally   Skin:   No bleeding, bruising or rash   Neurologic:   Cranial nerves 2 - 12 grossly intact, sensation intact, DTR        present and equal bilaterally       Discharge Disposition: Home    Discharge Medications   Kendy Langley   Home Medication Instructions SHRADDHA:[id number]    Printed on:17 1124   Medication Information                      aspirin 81 MG tablet  Take 81 mg by mouth Daily. HASNT TAKEN IN ABOUT 2 WEEKS - NO STENTS             lisinopril (PRINIVIL,ZESTRIL) 5 MG tablet  Take 5 mg by mouth Daily.             omeprazole (priLOSEC) 20 MG capsule  Take 20 mg by mouth As " Needed (GERD).             traMADol-acetaminophen (ULTRACET) 37.5-325 MG per tablet  Take 1-2 tablets by mouth Every 6 (Six) Hours As Needed for Moderate Pain (4-6) for up to 10 days.                 Discharge Diet: soft, bland diet    Activity at Discharge: ambulate    Follow-up Appointments  Dr. Coburn per her orders    Discharge took over 30 min.     Nelson Bishop MD  06/21/17  11:25 AM

## (undated) DEVICE — SUT ETHLN 3/0 KS 30IN 627H

## (undated) DEVICE — COVER,MAYO STAND,STERILE: Brand: MEDLINE

## (undated) DEVICE — LAPAROSCOPY WOUND CLOSURE SYSTEM KIT CONSISTS OF:05391529640002; NEOCLOSE ANCHORGUIDE 5/12 & 8/15 US; MODEL NUMBER NCA515-U; QTY 10 AND05391529640019; NEOCLOSE AUTOANCHOR X2 PACK US; MODEL NUMBER NCA2-U;  QTY 10: Brand: NEOCLOSE ANCHORGUIDE REGULAR PORT CLOSURE KIT US

## (undated) DEVICE — ENDOPATH XCEL BLADELESS TROCARS WITH STABILITY SLEEVES: Brand: ENDOPATH XCEL

## (undated) DEVICE — ECHELON FLEX POWERED PLUS LONG ARTICULATING ENDOSCOPIC LINEAR CUTTER, 60MM: Brand: ECHELON FLEX

## (undated) DEVICE — SYS SKIN CLS DERMABOND PRINEO W/60CM MESH TP

## (undated) DEVICE — LEGGINGS, PAIR, 29X43, STERILE: Brand: MEDLINE

## (undated) DEVICE — TRY SKINPREP DRYPREP

## (undated) DEVICE — SUT PDS 1 CTX 36IN VIO PDP371T

## (undated) DEVICE — PK LAP LASR CHOLE 10

## (undated) DEVICE — SUT MNCRYL PLS ANTIB UD 3/0 PS2 27IN

## (undated) DEVICE — ENDOCUT SCISSOR TIP, DISPOSABLE: Brand: RENEW

## (undated) DEVICE — [HIGH FLOW HEATED INSUFFLATOR TUBING,  DO NOT USE IF PACKAGE IS DAMAGED]

## (undated) DEVICE — ACCESS PLATFORM FOR MINIMALLY INVASIVE SURGERY.: Brand: GELPORT® LAPAROSCOPIC  SYSTEM

## (undated) DEVICE — ENCORE® LATEX MICRO SIZE 6.5, STERILE LATEX POWDER-FREE SURGICAL GLOVE: Brand: ENCORE

## (undated) DEVICE — JACKSON-PRATT 100CC BULB RESERVOIR: Brand: CARDINAL HEALTH

## (undated) DEVICE — SYS SKIN CLS DERMABOND PRINEO W/22CM MESH TP

## (undated) DEVICE — INTENDED FOR TISSUE SEPARATION, AND OTHER PROCEDURES THAT REQUIRE A SHARP SURGICAL BLADE TO PUNCTURE OR CUT.: Brand: BARD-PARKER ® STAINLESS STEEL BLADES

## (undated) DEVICE — FLTR PLUMEPORT LAP W/CONN STRL

## (undated) DEVICE — VISUALIZATION SYSTEM: Brand: CLEARIFY

## (undated) DEVICE — DRAIN JACKSON PRATT ROUND 15FR: Brand: CARDINAL HEALTH

## (undated) DEVICE — WIPE THERAWASH SLV SPEC CARE 2PK

## (undated) DEVICE — DBD-DRAPE,LAP,CHOLE,W/TROUGHS,STERILE: Brand: MEDLINE

## (undated) DEVICE — MEDI-VAC NON-CONDUCTIVE SUCTION TUBING: Brand: CARDINAL HEALTH

## (undated) DEVICE — AIRWY 90MM NO9

## (undated) DEVICE — PENCL E/S HNDSWCH ROCKRBTN HOLSTR 10FT

## (undated) DEVICE — ANTIBACTERIAL UNDYED BRAIDED (POLYGLACTIN 910), SYNTHETIC ABSORBABLE SUTURE: Brand: COATED VICRYL

## (undated) DEVICE — MEDI-VAC YANKAUER SUCTION HANDLE: Brand: CARDINAL HEALTH

## (undated) DEVICE — SAFESECURE,SECUREMENT,FOLEY CATH,STERILE: Brand: MEDLINE

## (undated) DEVICE — TOTAL TRAY, 16FR 10ML SIL FOLEY, URN: Brand: MEDLINE

## (undated) DEVICE — SUT VIC 0 TIES 18IN J912G

## (undated) DEVICE — MEDI-VAC YANKAUER SUCTION HANDLE W/BULBOUS TIP: Brand: CARDINAL HEALTH

## (undated) DEVICE — ENCORE® LATEX MICRO SIZE 7, STERILE LATEX POWDER-FREE SURGICAL GLOVE: Brand: ENCORE

## (undated) DEVICE — Device

## (undated) DEVICE — SPNG LAP PREWASH SFTPK 18X18IN 5PK STRL

## (undated) DEVICE — DEV LAP LIGASURE BLNT SEALER/DIV MARYLAND 37CM